# Patient Record
Sex: MALE | NOT HISPANIC OR LATINO | Employment: FULL TIME | URBAN - METROPOLITAN AREA
[De-identification: names, ages, dates, MRNs, and addresses within clinical notes are randomized per-mention and may not be internally consistent; named-entity substitution may affect disease eponyms.]

---

## 2020-01-06 ENCOUNTER — TRANSCRIBE ORDERS (OUTPATIENT)
Dept: PHYSICAL THERAPY | Facility: CLINIC | Age: 30
End: 2020-01-06

## 2020-01-06 DIAGNOSIS — S39.012A STRAIN OF LUMBAR REGION, INITIAL ENCOUNTER: Primary | ICD-10-CM

## 2020-01-07 ENCOUNTER — TREATMENT (OUTPATIENT)
Dept: PHYSICAL THERAPY | Facility: CLINIC | Age: 30
End: 2020-01-07

## 2020-01-07 DIAGNOSIS — M54.50 ACUTE BILATERAL LOW BACK PAIN WITHOUT SCIATICA: Primary | ICD-10-CM

## 2020-01-07 PROCEDURE — 97110 THERAPEUTIC EXERCISES: CPT | Performed by: PHYSICAL THERAPIST

## 2020-01-07 PROCEDURE — 97162 PT EVAL MOD COMPLEX 30 MIN: CPT | Performed by: PHYSICAL THERAPIST

## 2020-01-07 NOTE — PROGRESS NOTES
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     Pain was immediate and progressive. Left hip feels like it pops out of place after about 10 minutes of walking.     Patient states he is also going to a chiropractor which has bee helpful. Gets back adjusted and hip back into place.       Patient Occupation: [employer], .    Precautions and Work Restrictions: light dutyPain  Current pain rating: 3  At best pain ratin  At worst pain ratin  Location: Left lumbar, both lateral hips into front of thighs   Quality: dull ache, sharp and tight  Relieving factors: medications, rest and ice  Aggravating factors: sleeping, squatting, standing, stairs, lifting and movement    Treatments  Current treatment: chiropractic  Patient Goals  Patient goals for therapy: decreased edema, decreased pain, improved balance, increased motion, return to work, return to sport/leisure activities, independence with ADLs/IADLs and increased strength             Objective       Palpation   Left   Hypertonic in the erector spinae, lumbar paraspinals, piriformis and quadratus lumborum.   Tenderness of the erector spinae, lumbar paraspinals, piriformis and quadratus lumborum.     Right Tenderness of the erector spinae.     Tenderness     Lumbar Spine  Tenderness in the spinous process.     Neurological Testing     Sensation     Lumbar   Left   Intact: light touch    Right   Intact: light touch    Active Range of Motion     Lumbar   Flexion: 20 degrees with pain  Extension: WFL and with pain    Strength/Myotome Testing      Left Hip   Planes of Motion   Flexion: 4-  Extension: 4-  Abduction: 4-  External rotation: 4  Internal rotation: 4    Right Hip   Planes of Motion   Flexion: 4  Extension: 4  Abduction: 4  External rotation: 4  Internal rotation: 4    Left Knee   Flexion: WFL  Extension: WFL    Right Knee   Flexion: WFL  Extension: WFL    Tests     Lumbar     Left   Positive crossed SLR.   Negative passive SLR.     Right   Negative passive SLR.     Left Pelvic Girdle/Sacrum   Negative: sacrum compression and thigh thrust.     Right Pelvic Girdle/Sacrum   Negative: sacrum compression and thigh thrust.     Left Hip   Positive PATRICA and scour.          Assessment & Plan     Assessment  Impairments: abnormal coordination, abnormal gait, abnormal muscle firing, abnormal muscle tone, abnormal or restricted ROM, activity intolerance, impaired balance, impaired physical strength, lacks appropriate home exercise program and pain with function  Assessment details: Patient is a 29 year old male presenting with acute low back pain with left side worse than right following injury at work on 12/21/19 while lifting in a flexed position. Signs and symptoms are consistent with muscle strain and possible left hip joint involvement. Patient presents with decreased lumbar mobility secondary to pain, decreased hip strength, positive lumbar instability, and decreased ability to squat and lift which he does a lot of for work. Patient is appropriate for physical therapy to address the above issues.   Barriers to therapy: Morbid obesity   Prognosis: good  Prognosis details: Short Term Goals (3 weeks):  1. Patient will be independent with home exercise program.  2. Patient will demonstrate improved hip strength by 50%.  3. Patient will demonstrate improved lumbar mobility by 50%    Long Term Goals (8 weeks):  1. Patient will be able to walk at least 20 minutes with back pain no greater than 2/10.  2. Patient will demonstrate functional squat with back  pain no greater than 2/10.  3. Patient will be able to return to full work/home duty with back pain no greater than 2/10.    Functional Limitations: lifting, sleeping, walking, uncomfortable because of pain, moving in bed and sitting  Plan  Therapy options: will be seen for skilled physical therapy services  Planned modality interventions: ultrasound, traction, thermotherapy (hydrocollator packs), TENS, high voltage pulsed current (spasm management), high voltage pulsed current (pain management), electrical stimulation/Russian stimulation and cryotherapy  Planned therapy interventions: therapeutic activities, stretching, strengthening, spinal/joint mobilization, soft tissue mobilization, postural training, neuromuscular re-education, motor coordination training, manual therapy, abdominal trunk stabilization, ADL retraining, balance/weight-bearing training, body mechanics training, fine motor coordination training, flexibility, gait training, functional ROM exercises, home exercise program, IADL retraining and joint mobilization  Frequency: 2-3x/week.  Duration in visits: 16  Treatment plan discussed with: patient        Manual Therapy:         mins  87871;  Therapeutic Exercise:    41     mins  73194;     Neuromuscular Kika:        mins  53432;    Therapeutic Activity:          mins  93007;     Gait Training:          mins  85777;     Ultrasound:          mins  12228;    Electrical Stimulation:         mins  21133 ( );  Dry Needling          mins self-pay    Timed Treatment:   41   mins   Total Treatment:     62   mins    PT SIGNATURE: Carol Ann Kumar PT   DATE TREATMENT INITIATED: 1/7/2020    Initial Certification  Certification Period: 4/6/2020  I certify that the therapy services are furnished while this patient is under my care.  The services outlined above are required by this patient, and will be reviewed every 90 days.     PHYSICIAN: Glenn Harrington MD      DATE:     Please sign and return via fax to  [phone].. Thank you, [facility].

## 2020-01-15 ENCOUNTER — TREATMENT (OUTPATIENT)
Dept: PHYSICAL THERAPY | Facility: CLINIC | Age: 30
End: 2020-01-15

## 2020-01-15 DIAGNOSIS — M54.50 ACUTE BILATERAL LOW BACK PAIN WITHOUT SCIATICA: Primary | ICD-10-CM

## 2020-01-15 PROCEDURE — 97110 THERAPEUTIC EXERCISES: CPT | Performed by: PHYSICAL THERAPIST

## 2020-01-22 ENCOUNTER — TREATMENT (OUTPATIENT)
Dept: PHYSICAL THERAPY | Facility: CLINIC | Age: 30
End: 2020-01-22

## 2020-01-22 DIAGNOSIS — M54.50 ACUTE BILATERAL LOW BACK PAIN WITHOUT SCIATICA: Primary | ICD-10-CM

## 2020-01-22 PROCEDURE — 97110 THERAPEUTIC EXERCISES: CPT | Performed by: PHYSICAL THERAPIST

## 2020-01-22 NOTE — PROGRESS NOTES
Visit #: 3    Subjective   Kraig Fontaine reports: Back feeling better. Still a little pain when bending over. Has not tried lifting heavy yet.     Objective   Mild tenderness in back with 25 lb box lift to waist height. Patient cued for safe lifting technique.     See Exercise, Manual, and Modality Logs for complete treatment.       Assessment/Plan  Will continue to progress back strengthening and stability. Patient able to tolerate relatively light lifting with back tenderness.   Progress per Plan of Care           Manual Therapy:         mins  35016;  Therapeutic Exercise:    54     mins  83653;     Neuromuscular Kika:        mins  15218;    Therapeutic Activity:          mins  47035;     Gait Training:           mins  67122;     Ultrasound:          mins  49251;   Iontophoresis          mins  98692   Electrical Stimulation:         mins  96407 ( );  Dry Needling          mins self-pay  Fluidotherapy          mins [id number]    Timed Treatment:  54    mins   Total Treatment:     64   mins    Carol Ann Kumar PT  Physical Therapist

## 2020-02-04 ENCOUNTER — TREATMENT (OUTPATIENT)
Dept: PHYSICAL THERAPY | Facility: CLINIC | Age: 30
End: 2020-02-04

## 2020-02-04 DIAGNOSIS — M54.50 ACUTE BILATERAL LOW BACK PAIN WITHOUT SCIATICA: Primary | ICD-10-CM

## 2020-02-04 PROCEDURE — 97110 THERAPEUTIC EXERCISES: CPT | Performed by: PHYSICAL THERAPIST

## 2020-02-04 NOTE — PROGRESS NOTES
Discharge Note     Visit #: 4    Subjective   Kraig Fontaine reports: Has been back to normal activity without any issues with back.     Objective       Active Range of Motion     Lumbar   Flexion: WFL  Extension: WFL    Strength/Myotome Testing     Left Hip   Planes of Motion   Flexion: 5  Extension: 5  Abduction: 5  External rotation: 5  Internal rotation: 5    Right Hip   Planes of Motion   Flexion: 5  Extension: 5  Abduction: 5  External rotation: 5  Internal rotation: 5     Squat: able to squat fully without back discomfort    Lift: able to lift at least 75 lbs from floor to waist high shelf x10 without discomfort in back    Anti-rotation: able to stabilize against at least 20 lbs rotational force without discomfort in lower back.      See Exercise, Manual, and Modality Logs for complete treatment.       Assessment/Plan  Patient reports ability to perform full work day without lumbar discomfort. Hip strength and lumbar mobility is normal. Discussed continuing stability exercises to prevent future issues.   Other  Patient is discharged at this time with all goals met.          Manual Therapy:         mins  65294;  Therapeutic Exercise:    43     mins  19332;     Neuromuscular Kika:        mins  71481;    Therapeutic Activity:          mins  56088;     Gait Training:           mins  69103;     Ultrasound:          mins  64974;   Iontophoresis          mins  78132   Electrical Stimulation:        mins  71316 (MC );  Dry Needling          mins self-pay  Fluidotherapy          mins 01521    Timed Treatment:   43   mins   Total Treatment:     43   mins    Carol Ann Kumar PT  Physical Therapist

## 2022-08-01 NOTE — PROGRESS NOTES
Visit #: 2    Subjective   Kraig Minal reports: No pain at rest. Back is feeling better. Can still feel painful tightness when squatting, lifting, and bending.     Objective     See Exercise, Manual, and Modality Logs for complete treatment.       Assessment/Plan  Patient responding well with exercises. Will continue working on lumbar stability and strengthening as tolerated.   Progress per Plan of Care           Manual Therapy:         mins  40461;  Therapeutic Exercise:    53     mins  45090;     Neuromuscular Kika:        mins  47974;    Therapeutic Activity:          mins  67295;     Gait Training:           mins  65125;     Ultrasound:          mins  96547;   Iontophoresis          mins  30981   Electrical Stimulation:         mins  31486 ( );  Dry Needling          mins self-pay  Fluidotherapy          mins 09264    Timed Treatment:   53   mins   Total Treatment:     63   mins    Carol Ann Kumar PT  Physical Therapist   Impression: Other vitreous opacities, bilateral: H43.393. Plan: Vitreous floaters/opacities both eyes - reviewed the signs and symptoms of possible retinal detachment (flashes, floaters, change in peripheral vision, etc). Advised to contact us immediately for any of these or other new symptoms.